# Patient Record
Sex: MALE | Race: OTHER | URBAN - METROPOLITAN AREA
[De-identification: names, ages, dates, MRNs, and addresses within clinical notes are randomized per-mention and may not be internally consistent; named-entity substitution may affect disease eponyms.]

---

## 2022-05-06 ENCOUNTER — OFFICE VISIT (OUTPATIENT)
Dept: RHEUMATOLOGY | Facility: CLINIC | Age: 52
End: 2022-05-06
Payer: COMMERCIAL

## 2022-05-06 VITALS
DIASTOLIC BLOOD PRESSURE: 80 MMHG | BODY MASS INDEX: 31.68 KG/M2 | HEART RATE: 65 BPM | HEIGHT: 62 IN | WEIGHT: 172.19 LBS | SYSTOLIC BLOOD PRESSURE: 138 MMHG

## 2022-05-06 DIAGNOSIS — R53.83 FATIGUE, UNSPECIFIED TYPE: Primary | ICD-10-CM

## 2022-05-06 DIAGNOSIS — R76.8 POSITIVE ANA (ANTINUCLEAR ANTIBODY): ICD-10-CM

## 2022-05-06 RX ORDER — ERGOCALCIFEROL 1.25 MG/1
CAPSULE ORAL
Qty: 12 CAPSULE | Refills: 0 | Status: SHIPPED | OUTPATIENT
Start: 2022-05-06

## 2022-05-06 NOTE — PROGRESS NOTES
Dear Dr. Patricia Aguirre:    I saw your patient Kimani Valenzuela in consultation this afternoon at your request, for evaluation of positive DENZEL. A  was on the phone with us. As you know, he is a 79-year-old gentleman who had COVID infection a year ago, and was hospitalized for a week. He recovered fully. When you recently saw him, he explained that at night his arms can feel numb. When he gets up and moves them, it quickly goes away. At work if he stands too long, he will have pain under his heels. His hands feel tired. His muscle strength is fine. At night he tosses and turns. He is not refreshed on awakening, and his energy is low. When he gets out of bed, his joints are not swollen. He can have tenderness between his shoulder blades and over his shoulders. His arms can be tired during the day. Labs were done March 30th, 2022. DENZEL was 1-80 nucleolar and homogeneous. Rheumatoid factor negative. C-reactive protein normal.  TSH normal.  Hepatitis B and C serology negative. HIV negative. PSA normal.  25 hydroxy vitamin D was 22. Hemoglobin A1c normal.  He never got the prescription for vitamin D. Past medical history:  He denies medical or surgical problems. No medications. No known drug allergies. Family history:  His mother had some joint pains and was elderly. No rheumatoid, lupus, or other autoimmune disorders. Social history:  Is  with 2 children. He works as a cook at [employer]. No cigarettes. Occasional alcohol. He drinks coffee. He is does some jumping jacks and exercise over video. Review of systems:  No fevers, chills, sweats, anorexia. It is hard to lose the pounds that he had gained. No rash. He is always had thin hair. His eyes do not get inflamed. He has glasses to use. No oral or nasal ulcers, of adenopathy. No shortness of breath or chest pain. He had acid reflux in the past but not recently.   No stomach pain, nausea or vomiting, constipation or diarrhea, blood in his stools. He gets up 3-4 times at night to urinate. No dry eyes or mouth. No Raynaud's or headache. Physical exam:  Pleasant gentleman in no acute distress. Blood pressure 138/80, pulse 65, height 5' 2\" (1.575 m), weight 172 lb 3.2 oz (78.1 kg). No rash. His hair is thin. No conjunctival injection. No nasal or oral lesions. No cervical adenopathy. Lungs clear. S1 and S2 regular. Abdomen without hepatosplenomegaly or tenderness. Normal bowel sounds. No lower extremity edema. Neck moves well. Shoulders move normally. Elbows, wrist, and hands are normal.  Lumbar spine flexion is good. Hips move well. Knees flex and extend well with some crepitance. Ankles move well. The balls of his feet are nontender to squeeze. Strength is excellent diffusely including deltoids, iliopsoas, hamstrings, quads,  strength. Assessment and plan:    1. DENZEL 1-80 homogeneous and nucleolar. I reassured him that this is not clinically significant. He does not have a good history for lupus-like disease and his other labs are all unremarkable. It does not require further work-up. 2.  Nonrestorative sleep, fatigue, some myofascial discomfort especially between his shoulder blades and upper shoulders. He will continue his exercise program.    3.  Vitamin D deficiency. He did not get his initial prescription so I sent in a prescription for 50,000 units weekly for 12 weeks. Hopefully he will feel better with normal vitamin D levels. Thank you for inviting me to participate in his care. I would be glad to see him back if needed. Sincerely,      Dr. Grace Ramirez MD   Rheumatology.